# Patient Record
Sex: FEMALE | Race: BLACK OR AFRICAN AMERICAN | NOT HISPANIC OR LATINO | URBAN - METROPOLITAN AREA
[De-identification: names, ages, dates, MRNs, and addresses within clinical notes are randomized per-mention and may not be internally consistent; named-entity substitution may affect disease eponyms.]

---

## 2018-12-02 ENCOUNTER — EMERGENCY (EMERGENCY)
Facility: HOSPITAL | Age: 23
LOS: 0 days | Discharge: HOME | End: 2018-12-02
Attending: EMERGENCY MEDICINE
Payer: COMMERCIAL

## 2018-12-02 VITALS
DIASTOLIC BLOOD PRESSURE: 73 MMHG | HEIGHT: 64 IN | RESPIRATION RATE: 22 BRPM | SYSTOLIC BLOOD PRESSURE: 119 MMHG | WEIGHT: 139.99 LBS | HEART RATE: 66 BPM | TEMPERATURE: 99 F | OXYGEN SATURATION: 99 %

## 2018-12-02 DIAGNOSIS — S09.90XA UNSPECIFIED INJURY OF HEAD, INITIAL ENCOUNTER: ICD-10-CM
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
## 2018-12-02 DIAGNOSIS — R51 HEADACHE: ICD-10-CM

## 2018-12-02 DIAGNOSIS — S16.1XXA STRAIN OF MUSCLE, FASCIA AND TENDON AT NECK LEVEL, INITIAL ENCOUNTER: ICD-10-CM

## 2018-12-02 DIAGNOSIS — M54.2 CERVICALGIA: ICD-10-CM

## 2018-12-02 PROCEDURE — 76377 3D RENDER W/INTRP POSTPROCES: CPT | Mod: 26

## 2018-12-02 PROCEDURE — 72125 CT NECK SPINE W/O DYE: CPT | Mod: 26

## 2018-12-02 PROCEDURE — 99284 EMERGENCY DEPT VISIT MOD MDM: CPT

## 2018-12-02 PROCEDURE — 70450 CT HEAD/BRAIN W/O DYE: CPT | Mod: 26

## 2018-12-02 RX ORDER — METHOCARBAMOL 500 MG/1
1 TABLET, FILM COATED ORAL
Qty: 15 | Refills: 0 | OUTPATIENT
Start: 2018-12-02 | End: 2018-12-06

## 2018-12-02 RX ORDER — ACETAMINOPHEN 500 MG
975 TABLET ORAL ONCE
Qty: 0 | Refills: 0 | Status: COMPLETED | OUTPATIENT
Start: 2018-12-02 | End: 2018-12-02

## 2018-12-02 RX ORDER — IBUPROFEN 200 MG
1 TABLET ORAL
Qty: 15 | Refills: 0 | OUTPATIENT
Start: 2018-12-02 | End: 2018-12-06

## 2018-12-02 RX ADMIN — Medication 975 MILLIGRAM(S): at 19:17

## 2018-12-02 RX ADMIN — Medication 975 MILLIGRAM(S): at 21:16

## 2018-12-02 NOTE — ED PROVIDER NOTE - EXTREMITY EXAM
Pt is able actively abduct/adduct/flex/extend bilateral upper extremities against resistances/no deformity, pain or tenderness, no restriction of movement
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
## 2018-12-02 NOTE — ED PROVIDER NOTE - ENMT, MLM
Airway patent, Nasal mucosa clear. Mouth with normal mucosa. Throat has no vesicles, no oropharyngeal exudates and uvula is midline. No hematoma no wound to the scalp or face.

## 2018-12-02 NOTE — ED ADULT NURSE NOTE - CAS ELECT INFOMATION PROVIDED
discharged home, instructed to follow up with ortho, verbalized understanding of isntruction/DC instructions

## 2018-12-02 NOTE — ED PROVIDER NOTE - CONSTITUTIONAL, MLM
normal... Well appearing, well nourished, awake, alert, oriented to person, place, time/situation and in no apparent distress. Speaking in clear full sentences no nasal flaring no shoulders retractions not holding her head/chest/abdomen/neck, appears very comfortable sitting up in the stretcher talking on the cell phone in a bright light room.

## 2018-12-02 NOTE — ED ADULT NURSE NOTE - NSIMPLEMENTINTERV_GEN_ALL_ED
Implemented All Universal Safety Interventions:  [place] to call system. Call bell, personal items and telephone within reach. Instruct patient to call for assistance. Room bathroom lighting operational. Non-slip footwear when patient is off stretcher. Physically safe environment: no spills, clutter or unnecessary equipment. Stretcher in lowest position, wheels locked, appropriate side rails in place.
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
## 2018-12-02 NOTE — ED PROVIDER NOTE - MUSCULOSKELETAL NECK EXAM
No vertebral point tenderness no pain on movements./no deformity, pain or tenderness. no restriction of movement/trachea midline/supple

## 2018-12-02 NOTE — ED PROVIDER NOTE - PROGRESS NOTE DETAILS
Pt has been alert and oriented x 3 no focal neuro deficits on exam. Pt denies dizziness, nausea, vomiting, focal/distal weakness or numbness. Pt and sister are here given and explained all test reports and advised to follow up spinal doctor for further management.
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
## 2023-04-27 NOTE — ED PROVIDER NOTE - EYES, MLM
DISCHARGE INSTRUCTIONS     As part of your transition home, we are providing you with this set of discharge instructions, as a guide to help you in that process. In addition to the care provided during your hospital stay, there may be upcoming clinic visits, laboratory appointments, and/or results noted on this After Visit Summary (AVS).     To assist the physicians caring for you during this transition, we would like you remind you of the followin. Please call a Provider for help if you experience any of the following: Any questions or concerns, Dehydration, and Inability to eat, drink, or take medication  2. Activity Instructions: Normal activity as tolerated  3. Dressing/Wound Instructions: Not applicable  4. Lifting & Weight-bearing Instructions: No restrictions  5. Diet: Return to previous diet  6. Miscellaneous: Make sure you are drinking plenty of water and staying hydrated    If you have any questions 24-48 hours after discharge, please feel free to contact the hospital unit/department you were discharged from.     
Clear bilaterally, pupils equal, round and reactive to light. No photophobia bilaterally
92